# Patient Record
Sex: FEMALE | Race: WHITE | Employment: FULL TIME | URBAN - METROPOLITAN AREA
[De-identification: names, ages, dates, MRNs, and addresses within clinical notes are randomized per-mention and may not be internally consistent; named-entity substitution may affect disease eponyms.]

---

## 2018-08-01 ENCOUNTER — HOSPITAL ENCOUNTER (OUTPATIENT)
Dept: NUCLEAR MEDICINE | Age: 53
Discharge: HOME | End: 2018-08-01
Attending: FAMILY MEDICINE
Payer: COMMERCIAL

## 2018-08-01 DIAGNOSIS — R79.89 ABNORMAL TSH: ICD-10-CM

## 2018-08-02 ENCOUNTER — HOSPITAL ENCOUNTER (OUTPATIENT)
Dept: NUCLEAR MEDICINE | Age: 53
Discharge: HOME | End: 2018-08-02
Attending: FAMILY MEDICINE
Payer: COMMERCIAL

## 2018-08-02 PROCEDURE — 78014 THYROID IMAGING W/BLOOD FLOW: CPT

## 2022-11-11 ENCOUNTER — OFFICE VISIT (OUTPATIENT)
Dept: ORTHOPEDIC SURGERY | Age: 57
End: 2022-11-11
Payer: COMMERCIAL

## 2022-11-11 DIAGNOSIS — M17.11 PRIMARY OSTEOARTHRITIS OF RIGHT KNEE: ICD-10-CM

## 2022-11-11 DIAGNOSIS — M25.561 RIGHT KNEE PAIN, UNSPECIFIED CHRONICITY: Primary | ICD-10-CM

## 2022-11-11 PROCEDURE — 99204 OFFICE O/P NEW MOD 45 MIN: CPT | Performed by: STUDENT IN AN ORGANIZED HEALTH CARE EDUCATION/TRAINING PROGRAM

## 2022-11-11 RX ORDER — METHYLPREDNISOLONE ACETATE 40 MG/ML
40 INJECTION, SUSPENSION INTRA-ARTICULAR; INTRALESIONAL; INTRAMUSCULAR; SOFT TISSUE ONCE
Status: COMPLETED | OUTPATIENT
Start: 2022-11-11 | End: 2022-11-11

## 2022-11-11 RX ADMIN — METHYLPREDNISOLONE ACETATE 40 MG: 40 INJECTION, SUSPENSION INTRA-ARTICULAR; INTRALESIONAL; INTRAMUSCULAR; SOFT TISSUE at 13:39

## 2022-11-11 NOTE — PROGRESS NOTES
Name: Anastasiia Kohli  YOB: 1965  Gender: female  MRN: [id number]  Date of Encounter:  11/11/2022       CHIEF COMPLAINT:     Chief Complaint   Patient presents with    Knee Pain     Right        SUBJECTIVE/OBJECTIVE:      HPI:    Patient is a 61 y.o. pleasant female who presents today for a new evaluation of her right knee. She reports chronic right knee pain that is progressing. She localizes her pain around the medial and lateral joint line, aching pain, worse with weightbearing. She has associated swelling to the knee and also feels like the knee is deviating medially. She gets occasional points of stiffness where it feels stuck, but never locks. She is fairly active and enjoys exercising, but cant run anymore due to her pain. No redness or warmth. Dr. Aislinn Brumfield did an arthroscopy on her knee 11 years ago, but she has had two scopes in the past. She also had a baker's cyst removed as a child. PAST HISTORY:   Past medical, surgical, family, social history and allergies reviewed by me. Pertinent history: hyperthyroidism  Tobacco use:  has no history on file for tobacco use. Diabetes: none  CKD: no  Anticoagulation: no      REVIEW OF SYSTEMS:   As noted in HPI. PHYSICAL EXAMINATION:     Gen: Well-developed, no acute distress   HEENT: NC/AT, EOMI   Neck: Trachea midline, normal ROM   CV: Regular rhythm by palpation of distal pulse, normal capillary refill   Pulm: No respiratory distress, no stridor   Psychiatric: Well oriented, normal mood and affect. Skin: No rashes, lesions or ulcers, normal temperature, turgor, and texture on uninvolved extremity.       ORTHO EXAM:    Right knee:     Alignment: normal  Inspection: No edema, No ecchymosis  Palpation: Effusion  none; Crepitus Positive, Patellar mobility normal  ROM: 140 flexion, 0 extension   Tenderness: Medial joint line, Lateral joint line  Provocative testing: (-) Yakov lateral, Yakov medial   Strength: Extensor mechanism intact  Sensation: intact to light touch   Capillary refill normal    Gait: Normal      DIAGNOSTIC IMAGING:     X-ray RIGHT knee 4 vw AP / lateral / Niall Hard / sunrise for knee pain    Findings: Moderate osteoarthritis of knee, most significant to medial compartment of knee with osteophyte development. No effusion. No acute fracture. Impression: Moderate to severe tricompartmental OA in right knee    I independently interpreted XR taken today     ASSESSMENT/PLAN:   1. Right knee pain, unspecified chronicity    2. Primary osteoarthritis of right knee       We discussed x-ray imaging findings and examination. I explained that her knee pain is primarily secondary to osteoarthritis. It was explained that osteoarthritis is a chronic degenerative condition. We discussed management of arthritis, including non-surgical and surgical options. she does not wish to pursue surgery at this time. I have discussed nonsurgical management options including oral and topical medications, bracing, physical therapy, weight loss, and injectables. A corticosteroid injection  was recommended today. Patient agreed with injection and this was performed. Patient exhibited pain relief after injection today. Patient to continue using their own brace  Counseled to remain active and participate in strengthening activity as pain tolerates. Oral NSAIDs and topical advised. 4 This is a chronic illness/condition with exacerbation and progression    Orders / medications today:   Orders Placed This Encounter   Procedures    XR KNEE RIGHT (MIN 4 VIEWS)     Right knee AP, lateral, skiers & tangential     Standing Status:   Future     Number of Occurrences:   1     Standing Expiration Date:   11/4/2023    US ARTHR/ASP/INJ MAJOR JNT/BURSA RIGHT     Standing Status:   Future     Number of Occurrences:   1     Standing Expiration Date:   11/11/2023      Follow up: Return in about 4 weeks (around 12/9/2022).      The patient expressed understanding and agreed with the plan. Saarh Morales MD   [facility]     This document was created using voice recognition software so frequent mistakes are possible. For any concerns about the wording of this document, please contact its creator for further clarification.

## 2022-11-11 NOTE — PROGRESS NOTES
Right Knee Injection - US guided      An injection of corticosteroid was discussed today and is indicated for patients pain and condition today. All risks and benefits were discussed and patient wishes to proceed. Prior to proceeding forward with a steroid injection to the right knee joint, proper informed consent was obtained. Time-out was conducted with all members of the care team. The patient was placed in a supine position with the right slightly flexed to 30 degrees. A superior lateral approach was utilized for this injection procedure. The ultrasound probe was use to localize the joint capsule. The site of the injection was then cleansed chlorhexidine. A sterile gel was then placed over the area and the probe was repositioned to reveal the intraarticular space. Following this a vapo coolant spray was utilized to provide local skin anesthesia. A solution of 40mg Depo-medrol and 4cc 1% lidocaine was delivered through a 22 gauge, 1.5\" into the joint capsule. The site was again cleansed with an alcohol swab. The patient tolerated this procedure well with no adverse events. There was some notable pain relief reported by the patient prior to leaving the office today. The patient was counseled not to submerge the site for 24 hours, not to perform strenuous activity for the next five days, and if notes any signs or symptoms consistent with joint infection or allergic reaction to go to a local emergency room. The patient was observed for 15 minutes postprocedure and was allowed to be discharged from clinic in their usual state of health. Ultrasound use was deemed to be medically necessary to ensure appropriate placement of the injectate. Risks including infection, bleeding, nerve damage, and pain at the site of injection were discussed at length with the patient. Benefits including pain relief were also discussed with the patient. Patient verbalizes understanding of these and agrees to procedure.  Images were saved to PACS system.

## 2023-04-05 ENCOUNTER — OFFICE VISIT (OUTPATIENT)
Dept: ORTHOPEDIC SURGERY | Age: 58
End: 2023-04-05

## 2023-04-05 DIAGNOSIS — M17.11 PRIMARY OSTEOARTHRITIS OF RIGHT KNEE: Primary | ICD-10-CM

## 2023-04-05 RX ORDER — METHYLPREDNISOLONE ACETATE 40 MG/ML
40 INJECTION, SUSPENSION INTRA-ARTICULAR; INTRALESIONAL; INTRAMUSCULAR; SOFT TISSUE ONCE
Status: COMPLETED | OUTPATIENT
Start: 2023-04-05 | End: 2023-04-05

## 2023-04-05 RX ADMIN — METHYLPREDNISOLONE ACETATE 40 MG: 40 INJECTION, SUSPENSION INTRA-ARTICULAR; INTRALESIONAL; INTRAMUSCULAR; SOFT TISSUE at 09:30

## 2023-04-05 NOTE — PROGRESS NOTES
Name: Yovani Quintana  YOB: 1965  Gender: female  MRN: [id number]  Date of Encounter:  4/5/2023       CHIEF COMPLAINT:     Chief Complaint   Patient presents with    Knee Pain     Recheck right, LV 11/11/2022- right knee CSI        SUBJECTIVE/OBJECTIVE:      HPI:    Patient is a 61 y.o. pleasant female who presents today for a return evaluation of her right knee. Working diagnosis:   1. Primary osteoarthritis of right knee       LOV: 11/11/2022 11/11/22: First encounter. CSI performed to right knee. 4/5/23: She did well from the CSI injection. Got about 4 to 5 months of pain relief. She has had return of her knee pain and swelling. She is requesting a repeat injection today. PAST HISTORY:   Past medical, surgical, family, social history and allergies reviewed by me. Unchanged from prior visit. REVIEW OF SYSTEMS:   As noted in HPI. PHYSICAL EXAMINATION:     Gen: Well-developed, no acute distress   HEENT: NC/AT, EOMI   Neck: Trachea midline, normal ROM   CV: Regular rhythm by palpation of distal pulse, normal capillary refill   Pulm: No respiratory distress, no stridor   Psychiatric: Well oriented, normal mood and affect. Skin: No rashes, lesions or ulcers, normal temperature, turgor, and texture on uninvolved extremity. ORTHO EXAM:    Right knee:  Flexion 130, extension 0. There is a moderate effusion and posterior palpable Baker's cyst.  There is medial joint line tenderness. Negative Yakov. Negative anterior posterior drawer. Normal capillary refill. DIAGNOSTIC IMAGING:     I have reviewed prior imaging studies. ASSESSMENT/PLAN:   1. Primary osteoarthritis of right knee         We discussed right knee joint injection today, risks and benefits of injection including pain with injection, bleeding, damage to surrounding structures, infection, elevation in blood glucose, steroid flare.  They wished to proceed with injection today and this was

## 2023-04-26 DIAGNOSIS — M17.11 PRIMARY OSTEOARTHRITIS OF RIGHT KNEE: Primary | ICD-10-CM
